# Patient Record
Sex: FEMALE | Race: BLACK OR AFRICAN AMERICAN | NOT HISPANIC OR LATINO | Employment: OTHER | URBAN - METROPOLITAN AREA
[De-identification: names, ages, dates, MRNs, and addresses within clinical notes are randomized per-mention and may not be internally consistent; named-entity substitution may affect disease eponyms.]

---

## 2022-08-23 ENCOUNTER — OFFICE VISIT (OUTPATIENT)
Dept: OBSTETRICS AND GYNECOLOGY | Facility: CLINIC | Age: 72
End: 2022-08-23
Payer: MEDICARE

## 2022-08-23 VITALS
WEIGHT: 183.38 LBS | DIASTOLIC BLOOD PRESSURE: 84 MMHG | HEIGHT: 68 IN | SYSTOLIC BLOOD PRESSURE: 183 MMHG | BODY MASS INDEX: 27.79 KG/M2

## 2022-08-23 DIAGNOSIS — N81.9 VAGINAL VAULT PROLAPSE: ICD-10-CM

## 2022-08-23 DIAGNOSIS — B37.31 YEAST VAGINITIS: ICD-10-CM

## 2022-08-23 DIAGNOSIS — N89.8 VAGINAL EROSION: ICD-10-CM

## 2022-08-23 DIAGNOSIS — R33.9 URINARY RETENTION: ICD-10-CM

## 2022-08-23 DIAGNOSIS — N81.10 BLADDER PROLAPSE, FEMALE, ACQUIRED: ICD-10-CM

## 2022-08-23 DIAGNOSIS — Z12.31 BREAST CANCER SCREENING BY MAMMOGRAM: ICD-10-CM

## 2022-08-23 DIAGNOSIS — N93.9 COMPLAINT OF VAGINAL BLEEDING: ICD-10-CM

## 2022-08-23 PROBLEM — D25.9 UTERINE LEIOMYOMA: Status: ACTIVE | Noted: 2022-08-23

## 2022-08-23 PROBLEM — Z78.0 POSTMENOPAUSAL: Status: ACTIVE | Noted: 2022-08-23

## 2022-08-23 PROCEDURE — 99203 OFFICE O/P NEW LOW 30 MIN: CPT | Mod: S$GLB,,, | Performed by: OBSTETRICS & GYNECOLOGY

## 2022-08-23 PROCEDURE — 99203 PR OFFICE/OUTPT VISIT, NEW, LEVL III, 30-44 MIN: ICD-10-PCS | Mod: S$GLB,,, | Performed by: OBSTETRICS & GYNECOLOGY

## 2022-08-23 RX ORDER — POTASSIUM CHLORIDE 750 MG/1
10 TABLET, EXTENDED RELEASE ORAL DAILY
COMMUNITY
Start: 2022-08-15

## 2022-08-23 RX ORDER — DOXAZOSIN 8 MG/1
8 TABLET ORAL 2 TIMES DAILY
COMMUNITY
Start: 2022-07-27 | End: 2022-08-23 | Stop reason: SDUPTHER

## 2022-08-23 RX ORDER — CETIRIZINE HYDROCHLORIDE 10 MG/1
10 TABLET ORAL
COMMUNITY
Start: 2022-02-15

## 2022-08-23 RX ORDER — METFORMIN HYDROCHLORIDE 500 MG/1
TABLET ORAL
COMMUNITY
Start: 2021-12-27

## 2022-08-23 RX ORDER — LATANOPROSTENE BUNOD 0.24 MG/ML
1 SOLUTION/ DROPS OPHTHALMIC
COMMUNITY
Start: 2022-02-15 | End: 2022-08-23 | Stop reason: SDUPTHER

## 2022-08-23 RX ORDER — AMLODIPINE BESYLATE 5 MG/1
5 TABLET ORAL DAILY
COMMUNITY
Start: 2022-05-17

## 2022-08-23 RX ORDER — POTASSIUM CHLORIDE 750 MG/1
10 TABLET, EXTENDED RELEASE ORAL
COMMUNITY
Start: 2022-02-15 | End: 2022-08-23 | Stop reason: SDUPTHER

## 2022-08-23 RX ORDER — FLUCONAZOLE 100 MG/1
100 TABLET ORAL DAILY
Qty: 3 TABLET | Refills: 0 | Status: SHIPPED | OUTPATIENT
Start: 2022-08-23 | End: 2022-08-26

## 2022-08-23 RX ORDER — HYDROCHLOROTHIAZIDE 25 MG/1
25 TABLET ORAL DAILY
COMMUNITY
Start: 2022-07-06 | End: 2022-08-23 | Stop reason: SDUPTHER

## 2022-08-23 RX ORDER — CIPROFLOXACIN 500 MG/1
500 TABLET ORAL 2 TIMES DAILY
Qty: 14 TABLET | Refills: 0 | Status: SHIPPED | OUTPATIENT
Start: 2022-08-23 | End: 2022-08-30

## 2022-08-23 RX ORDER — CHOLECALCIFEROL (VITAMIN D3) 10 MCG
CAPSULE ORAL
COMMUNITY
Start: 2022-02-15

## 2022-08-23 RX ORDER — HYDROCHLOROTHIAZIDE 25 MG/1
TABLET ORAL
COMMUNITY
Start: 2022-02-15

## 2022-08-23 RX ORDER — LATANOPROST 50 UG/ML
1 SOLUTION/ DROPS OPHTHALMIC NIGHTLY
COMMUNITY
Start: 2022-07-27

## 2022-08-23 RX ORDER — LEVOTHYROXINE SODIUM 75 UG/1
75 TABLET ORAL EVERY MORNING
COMMUNITY
Start: 2022-06-21 | End: 2022-08-23 | Stop reason: SDUPTHER

## 2022-08-23 RX ORDER — DORZOLAMIDE HYDROCHLORIDE AND TIMOLOL MALEATE 20; 5 MG/ML; MG/ML
1 SOLUTION/ DROPS OPHTHALMIC 2 TIMES DAILY
COMMUNITY
Start: 2022-06-21

## 2022-08-23 RX ORDER — METFORMIN HYDROCHLORIDE 500 MG/1
500 TABLET, EXTENDED RELEASE ORAL DAILY
COMMUNITY
Start: 2022-06-21 | End: 2022-08-23 | Stop reason: SDUPTHER

## 2022-08-23 RX ORDER — DOXAZOSIN 8 MG/1
TABLET ORAL
COMMUNITY
Start: 2022-02-22

## 2022-08-23 RX ORDER — LEVOTHYROXINE SODIUM 75 UG/1
TABLET ORAL
COMMUNITY
Start: 2022-02-15

## 2022-08-23 NOTE — PROGRESS NOTES
Angy Lane is a 72 y.o.  who presents today for GYN problem visit.  And request her yearly mammogram.  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  This appears to be the cause of her noted vaginal bleeding.  So we discussed options.  The patient has tried pessaries in the past and a size 3 was too large in caused a vaginal erosion and the size to worked well for a while but fell out when straining with a bowel movement and she has not been back since that time.  We discussed options with the patient and she agrees to try the size 2 pessary again.  But we need to treat this vaginal erosion 1st with an antibiotic and also she needs a yeast medication.  We will treat that for 1 week and I gave her instructions about trying to push her bladder back in a as often as possible so it will rub on her clothing.  We will have her return in 1-2 weeks for placement of another pessary.  She understands that surgery is also an option and she is not sexually active so we would do a surgery that would mostly close down the vaginal area.  She wants to try pessary again at this time.    C/C:   Chief Complaint   Patient presents with    Vaginal Bleeding     Patient is here for vaginal bleeding.       HPI: Has GYN related concerns including   As above, vaginal bleeding..     MENSTRUAL HISTORY  No LMP recorded. Patient is postmenopausal..      PAP HISTORY: last pap ,  denies any history of abnormal pap smear        Review of patient's allergies indicates:   Allergen Reactions    Amlodipine Swelling     leg swelling    Losartan Swelling     lips    Clindamycin Diarrhea    Amoxicillin-pot clavulanate Nausea And Vomiting    Clarithromycin Nausea And Vomiting     Past Medical History:   Diagnosis Date    Breast disorder     Diabetes mellitus     Hypertension     Thyroid  "disease      Past Surgical History:   Procedure Laterality Date    BREAST BIOPSY      SINUS SURGERY      THYROID SURGERY       Past Surgical History:   Procedure Laterality Date    BREAST BIOPSY      SINUS SURGERY      THYROID SURGERY       OB History    Para Term  AB Living   2 2 2         SAB IAB Ectopic Multiple Live Births                  # Outcome Date GA Lbr Oneal/2nd Weight Sex Delivery Anes PTL Lv   2 Term            1 Term              OB History        2    Para   2    Term   2            AB        Living           SAB        IAB        Ectopic        Multiple        Live Births                   Family History   Problem Relation Age of Onset    Hypertension Mother      Social History     Substance and Sexual Activity   Sexual Activity Not Currently       Carolina Mendez MD          ROS:  GENERAL: Denies weight gain or weight loss. Feeling well overall.   SKIN: Denies rash or lesions.   HEAD: Denies head injury or headache.   NODES: Denies enlarged lymph nodes.   CHEST: Denies chest pain or shortness of breath.    ABDOMEN: No abdominal pain, constipation, diarrhea, nausea, vomiting or rectal bleeding.   URINARY: No frequency, dysuria, hematuria, or burning on urination.  REPRODUCTIVE: See HPI.   BREASTS: denies pain, lumps, or nipple discharge.   HEMATOLOGIC: No easy bruisability or excessive bleeding.   MUSCULOSKELETAL: Denies joint pain or swelling.   NEUROLOGIC: Denies syncope or weakness.   PSYCHIATRIC: Denies depression, anxiety or mood swings.      OBJECTIVE:  BP (!) 183/84   Ht 5' 8" (1.727 m)   Wt 83.2 kg (183 lb 6.4 oz)   BMI 27.89 kg/m²   PHYSICAL EXAM:  APPEARANCE: Well nourished, well developed, in no acute distress.  AFFECT: WNL, alert and oriented x 3  SKIN: No acne or hirsutism  CHEST: Good respiratory effect  ABDOMEN: Soft.  No tenderness or masses.  No hepatosplenomegaly.  No hernias.  BREASTS:   PELVIC:     EXTREMITIES: No edema.      A:    72 y.o. female "  for GYN problem visit  Body mass index is 27.89 kg/m².  Patient Active Problem List   Diagnosis    Postmenopausal    Uterine leiomyoma    Breast cancer screening by mammogram    Bladder prolapse, female, acquired    Vaginal vault prolapse    Urinary retention    Vaginal erosion    Complaint of vaginal bleeding         P:  Breast cancer screening by mammogram    Bladder prolapse, female, acquired    Vaginal vault prolapse    Urinary retention    Vaginal erosion    Complaint of vaginal bleeding      ----Continue annual exams, return then or sooner prn    See HPI for plan.    No follow-ups on file.

## 2022-08-29 ENCOUNTER — HOSPITAL ENCOUNTER (OUTPATIENT)
Dept: RADIOLOGY | Facility: CLINIC | Age: 72
Discharge: HOME | End: 2022-08-29
Attending: OBSTETRICS & GYNECOLOGY
Payer: COMMERCIAL

## 2022-08-29 DIAGNOSIS — Z12.31 BREAST CANCER SCREENING BY MAMMOGRAM: ICD-10-CM

## 2022-08-29 PROCEDURE — 77063 BREAST TOMOSYNTHESIS BI: CPT | Mod: S$GLB,,, | Performed by: RADIOLOGY

## 2022-08-29 PROCEDURE — 77063 MAMMO DIGITAL SCREENING BILAT WITH TOMO: ICD-10-PCS | Mod: S$GLB,,, | Performed by: RADIOLOGY

## 2022-08-29 PROCEDURE — 77067 MAMMO DIGITAL SCREENING BILAT WITH TOMO: ICD-10-PCS | Mod: S$GLB,,, | Performed by: RADIOLOGY

## 2022-08-29 PROCEDURE — 77067 SCR MAMMO BI INCL CAD: CPT | Mod: S$GLB,,, | Performed by: RADIOLOGY

## 2022-09-08 ENCOUNTER — OFFICE VISIT (OUTPATIENT)
Dept: OBSTETRICS AND GYNECOLOGY | Facility: CLINIC | Age: 72
End: 2022-09-08
Payer: COMMERCIAL

## 2022-09-08 VITALS
DIASTOLIC BLOOD PRESSURE: 100 MMHG | SYSTOLIC BLOOD PRESSURE: 204 MMHG | HEIGHT: 68 IN | BODY MASS INDEX: 26.43 KG/M2 | WEIGHT: 174.38 LBS

## 2022-09-08 DIAGNOSIS — N81.10 BLADDER PROLAPSE, FEMALE, ACQUIRED: Primary | ICD-10-CM

## 2022-09-08 DIAGNOSIS — N81.9 VAGINAL VAULT PROLAPSE: ICD-10-CM

## 2022-09-08 DIAGNOSIS — N89.8 VAGINAL EROSION: ICD-10-CM

## 2022-09-08 PROCEDURE — 1159F MED LIST DOCD IN RCRD: CPT | Mod: S$GLB,,, | Performed by: OBSTETRICS & GYNECOLOGY

## 2022-09-08 PROCEDURE — 1160F RVW MEDS BY RX/DR IN RCRD: CPT | Mod: S$GLB,,, | Performed by: OBSTETRICS & GYNECOLOGY

## 2022-09-08 PROCEDURE — 99213 OFFICE O/P EST LOW 20 MIN: CPT | Mod: S$GLB,,, | Performed by: OBSTETRICS & GYNECOLOGY

## 2022-09-08 PROCEDURE — 3080F DIAST BP >= 90 MM HG: CPT | Mod: S$GLB,,, | Performed by: OBSTETRICS & GYNECOLOGY

## 2022-09-08 PROCEDURE — 3008F PR BODY MASS INDEX (BMI) DOCUMENTED: ICD-10-PCS | Mod: S$GLB,,, | Performed by: OBSTETRICS & GYNECOLOGY

## 2022-09-08 PROCEDURE — 1101F PT FALLS ASSESS-DOCD LE1/YR: CPT | Mod: S$GLB,,, | Performed by: OBSTETRICS & GYNECOLOGY

## 2022-09-08 PROCEDURE — 3288F FALL RISK ASSESSMENT DOCD: CPT | Mod: S$GLB,,, | Performed by: OBSTETRICS & GYNECOLOGY

## 2022-09-08 PROCEDURE — 1160F PR REVIEW ALL MEDS BY PRESCRIBER/CLIN PHARMACIST DOCUMENTED: ICD-10-PCS | Mod: S$GLB,,, | Performed by: OBSTETRICS & GYNECOLOGY

## 2022-09-08 PROCEDURE — 99213 PR OFFICE/OUTPT VISIT, EST, LEVL III, 20-29 MIN: ICD-10-PCS | Mod: S$GLB,,, | Performed by: OBSTETRICS & GYNECOLOGY

## 2022-09-08 PROCEDURE — 3077F PR MOST RECENT SYSTOLIC BLOOD PRESSURE >= 140 MM HG: ICD-10-PCS | Mod: S$GLB,,, | Performed by: OBSTETRICS & GYNECOLOGY

## 2022-09-08 PROCEDURE — 1101F PR PT FALLS ASSESS DOC 0-1 FALLS W/OUT INJ PAST YR: ICD-10-PCS | Mod: S$GLB,,, | Performed by: OBSTETRICS & GYNECOLOGY

## 2022-09-08 PROCEDURE — 3008F BODY MASS INDEX DOCD: CPT | Mod: S$GLB,,, | Performed by: OBSTETRICS & GYNECOLOGY

## 2022-09-08 PROCEDURE — 3080F PR MOST RECENT DIASTOLIC BLOOD PRESSURE >= 90 MM HG: ICD-10-PCS | Mod: S$GLB,,, | Performed by: OBSTETRICS & GYNECOLOGY

## 2022-09-08 PROCEDURE — 3077F SYST BP >= 140 MM HG: CPT | Mod: S$GLB,,, | Performed by: OBSTETRICS & GYNECOLOGY

## 2022-09-08 PROCEDURE — 1159F PR MEDICATION LIST DOCUMENTED IN MEDICAL RECORD: ICD-10-PCS | Mod: S$GLB,,, | Performed by: OBSTETRICS & GYNECOLOGY

## 2022-09-08 PROCEDURE — 3288F PR FALLS RISK ASSESSMENT DOCUMENTED: ICD-10-PCS | Mod: S$GLB,,, | Performed by: OBSTETRICS & GYNECOLOGY

## 2022-09-08 NOTE — PROGRESS NOTES
Angy Lane is a 72 y.o.  who presents today for GYN problem visit.     C/C:   Chief Complaint   Patient presents with    Follow-up     Patient following up for prolapsed bladder.       HPI: Has GYN related concerns including she has complete vaginal vault prolapse and had erosion on the cervix last appointment a couple of weeks ago.  It appeared to be possibly infected so we treated her with antibiotics by mouth for 10 days and now she returns to see if she can have a pessary placed again.  She did well with a size 2 pessary in the past and we will try replacing that again this time.  She understands how to care for the pessary.  She will return in 4 weeks for pessary check..     MENSTRUAL HISTORY  No LMP recorded. Patient is postmenopausal..      PAP HISTORY: last pap ,  denies any history of abnormal pap smear        Review of patient's allergies indicates:   Allergen Reactions    Amlodipine Swelling     leg swelling    Losartan Swelling     lips    Clindamycin Diarrhea    Amoxicillin-pot clavulanate Nausea And Vomiting    Clarithromycin Nausea And Vomiting     Past Medical History:   Diagnosis Date    Breast disorder     Diabetes mellitus     Hypertension     Thyroid disease      Past Surgical History:   Procedure Laterality Date    BREAST BIOPSY      SINUS SURGERY      THYROID SURGERY       Past Surgical History:   Procedure Laterality Date    BREAST BIOPSY      SINUS SURGERY      THYROID SURGERY       OB History    Para Term  AB Living   2 2 2         SAB IAB Ectopic Multiple Live Births                  # Outcome Date GA Lbr Oneal/2nd Weight Sex Delivery Anes PTL Lv   2 Term            1 Term              OB History          2    Para   2    Term   2            AB        Living             SAB        IAB        Ectopic        Multiple        Live Births                   Family History   Problem Relation Age of Onset    Hypertension Mother     Breast cancer Neg  "Hx      Social History     Substance and Sexual Activity   Sexual Activity Not Currently       Carolina Mendez MD          ROS:  GENERAL: Denies weight gain or weight loss. Feeling well overall.   SKIN: Denies rash or lesions.   HEAD: Denies head injury or headache.   NODES: Denies enlarged lymph nodes.   CHEST: Denies chest pain or shortness of breath.    ABDOMEN: No abdominal pain, constipation, diarrhea, nausea, vomiting or rectal bleeding.   URINARY: No frequency, dysuria, hematuria, or burning on urination.  REPRODUCTIVE: See HPI.   BREASTS: denies pain, lumps, or nipple discharge.   HEMATOLOGIC: No easy bruisability or excessive bleeding.   MUSCULOSKELETAL: Denies joint pain or swelling.   NEUROLOGIC: Denies syncope or weakness.   PSYCHIATRIC: Denies depression, anxiety or mood swings.      OBJECTIVE:  BP (!) 204/100   Ht 5' 8" (1.727 m)   Wt 79.1 kg (174 lb 6.4 oz)   BMI 26.52 kg/m²   PHYSICAL EXAM:  APPEARANCE: Well nourished, well developed, in no acute distress.  AFFECT: WNL, alert and oriented x 3  SKIN: No acne or hirsutism  CHEST: Good respiratory effect  ABDOMEN: Soft.  No tenderness or masses.  No hepatosplenomegaly.  No hernias.  BREASTS:   PELVIC:     EXTREMITIES: No edema.      A:  Vaginal vault prolapse with a Palak on the external cervix which is completely prolapsed at the time of exam.  Now that has resolved with antibiotic.  72 y.o. female  for GYN problem visit  Body mass index is 26.52 kg/m².  Patient Active Problem List   Diagnosis    Postmenopausal    Uterine leiomyoma    Breast cancer screening by mammogram    Bladder prolapse, female, acquired    Vaginal vault prolapse    Urinary retention    Vaginal erosion    Complaint of vaginal bleeding    Yeast vaginitis       Plan:  Size 2 pessary was placed today and she will return in 1 month for pessary check.  We discussed options again and she request to continue to try pessary treatment.  There are no diagnoses linked to this " encounter.  ----Continue annual exams, return then or sooner prn      No follow-ups on file.

## 2022-10-07 ENCOUNTER — TELEPHONE (OUTPATIENT)
Dept: OBSTETRICS AND GYNECOLOGY | Facility: CLINIC | Age: 72
End: 2022-10-07

## 2022-10-07 ENCOUNTER — OFFICE VISIT (OUTPATIENT)
Dept: OBSTETRICS AND GYNECOLOGY | Facility: CLINIC | Age: 72
End: 2022-10-07
Payer: COMMERCIAL

## 2022-10-07 VITALS
SYSTOLIC BLOOD PRESSURE: 161 MMHG | DIASTOLIC BLOOD PRESSURE: 67 MMHG | WEIGHT: 168 LBS | BODY MASS INDEX: 25.46 KG/M2 | HEIGHT: 68 IN

## 2022-10-07 DIAGNOSIS — R33.9 URINARY RETENTION: ICD-10-CM

## 2022-10-07 DIAGNOSIS — N81.10 BLADDER PROLAPSE, FEMALE, ACQUIRED: Primary | ICD-10-CM

## 2022-10-07 DIAGNOSIS — N81.9 VAGINAL VAULT PROLAPSE: ICD-10-CM

## 2022-10-07 DIAGNOSIS — Z46.89 PESSARY MAINTENANCE: ICD-10-CM

## 2022-10-07 PROCEDURE — 1160F PR REVIEW ALL MEDS BY PRESCRIBER/CLIN PHARMACIST DOCUMENTED: ICD-10-PCS | Mod: S$GLB,,, | Performed by: OBSTETRICS & GYNECOLOGY

## 2022-10-07 PROCEDURE — 3008F BODY MASS INDEX DOCD: CPT | Mod: S$GLB,,, | Performed by: OBSTETRICS & GYNECOLOGY

## 2022-10-07 PROCEDURE — 99213 PR OFFICE/OUTPT VISIT, EST, LEVL III, 20-29 MIN: ICD-10-PCS | Mod: 25,S$GLB,, | Performed by: OBSTETRICS & GYNECOLOGY

## 2022-10-07 PROCEDURE — 3078F PR MOST RECENT DIASTOLIC BLOOD PRESSURE < 80 MM HG: ICD-10-PCS | Mod: S$GLB,,, | Performed by: OBSTETRICS & GYNECOLOGY

## 2022-10-07 PROCEDURE — 1101F PT FALLS ASSESS-DOCD LE1/YR: CPT | Mod: S$GLB,,, | Performed by: OBSTETRICS & GYNECOLOGY

## 2022-10-07 PROCEDURE — 3288F FALL RISK ASSESSMENT DOCD: CPT | Mod: S$GLB,,, | Performed by: OBSTETRICS & GYNECOLOGY

## 2022-10-07 PROCEDURE — 1159F PR MEDICATION LIST DOCUMENTED IN MEDICAL RECORD: ICD-10-PCS | Mod: S$GLB,,, | Performed by: OBSTETRICS & GYNECOLOGY

## 2022-10-07 PROCEDURE — 1159F MED LIST DOCD IN RCRD: CPT | Mod: S$GLB,,, | Performed by: OBSTETRICS & GYNECOLOGY

## 2022-10-07 PROCEDURE — 3008F PR BODY MASS INDEX (BMI) DOCUMENTED: ICD-10-PCS | Mod: S$GLB,,, | Performed by: OBSTETRICS & GYNECOLOGY

## 2022-10-07 PROCEDURE — 57160 INSERT PESSARY/OTHER DEVICE: CPT | Mod: S$GLB,,, | Performed by: OBSTETRICS & GYNECOLOGY

## 2022-10-07 PROCEDURE — 1160F RVW MEDS BY RX/DR IN RCRD: CPT | Mod: S$GLB,,, | Performed by: OBSTETRICS & GYNECOLOGY

## 2022-10-07 PROCEDURE — 1101F PR PT FALLS ASSESS DOC 0-1 FALLS W/OUT INJ PAST YR: ICD-10-PCS | Mod: S$GLB,,, | Performed by: OBSTETRICS & GYNECOLOGY

## 2022-10-07 PROCEDURE — 3077F PR MOST RECENT SYSTOLIC BLOOD PRESSURE >= 140 MM HG: ICD-10-PCS | Mod: S$GLB,,, | Performed by: OBSTETRICS & GYNECOLOGY

## 2022-10-07 PROCEDURE — 3077F SYST BP >= 140 MM HG: CPT | Mod: S$GLB,,, | Performed by: OBSTETRICS & GYNECOLOGY

## 2022-10-07 PROCEDURE — 99213 OFFICE O/P EST LOW 20 MIN: CPT | Mod: 25,S$GLB,, | Performed by: OBSTETRICS & GYNECOLOGY

## 2022-10-07 PROCEDURE — 57160 PR FIT/INSERT INTRAVAG SUPPORT DEVICE: ICD-10-PCS | Mod: S$GLB,,, | Performed by: OBSTETRICS & GYNECOLOGY

## 2022-10-07 PROCEDURE — 3288F PR FALLS RISK ASSESSMENT DOCUMENTED: ICD-10-PCS | Mod: S$GLB,,, | Performed by: OBSTETRICS & GYNECOLOGY

## 2022-10-07 PROCEDURE — 3078F DIAST BP <80 MM HG: CPT | Mod: S$GLB,,, | Performed by: OBSTETRICS & GYNECOLOGY

## 2022-10-07 RX ORDER — DORZOLAMIDE HYDROCHLORIDE AND TIMOLOL MALEATE PRESERVATIVE FREE 20; 5 MG/ML; MG/ML
1 SOLUTION/ DROPS OPHTHALMIC
COMMUNITY
Start: 2022-09-19

## 2022-10-07 NOTE — TELEPHONE ENCOUNTER
----- Message from Diane Moe MA sent at 10/6/2022  2:12 PM CDT -----  Regarding: appt  Patient called and canceled appt for tomorrow at 9:30. She stated she would call back to schedule at a later time. She stated she went to see her PCP and they did an u/s of renal and bladder yesterday due to blood. She is seeing urology tomorrow at 10:30 a.m. She said she will call once she finds out what is going on to schedule an appt for her pessary.

## 2022-10-07 NOTE — PROGRESS NOTES
Angy Lane is a 72 y.o.  who presents today for GYN problem visit.     C/C:   Chief Complaint   Patient presents with    Follow-up     Patient is here for follow-up and pessary insertion.       HPI: Has GYN related concerns including patient has a size 2 pessary that frequently falls out and she wants a larger pessary.  We did not have a size 3 today but we try to size for an it seems to fit appropriately.  So she will continue to try that.  On exam today she also has a cyst measuring approximately 10 cm in her mons area near the pubic bone mostly on the left and I asked her if it was painful and she said no and I also asked her if it is always that big and she states sometimes it gets bigger and then it goes down and it has never drained so this sounds like a possible hernia in that area but it feels more like a cystic mass.  She has been having some bladder problems and she is getting a bladder workup with CT scan of that area and hopefully they will be determining what this 10 cm bulging in the mons pubis area is..     MENSTRUAL HISTORY  No LMP recorded. Patient is postmenopausal..      PAP HISTORY: last pap ,  denies any history of abnormal pap smear        Review of patient's allergies indicates:   Allergen Reactions    Amlodipine Swelling     leg swelling    Losartan Swelling     lips    Clindamycin Diarrhea    Amoxicillin-pot clavulanate Nausea And Vomiting    Clarithromycin Nausea And Vomiting     Past Medical History:   Diagnosis Date    Breast disorder     Diabetes mellitus     Hypertension     Thyroid disease      Past Surgical History:   Procedure Laterality Date    BREAST BIOPSY      SINUS SURGERY      THYROID SURGERY       Past Surgical History:   Procedure Laterality Date    BREAST BIOPSY      SINUS SURGERY      THYROID SURGERY       OB History    Para Term  AB Living   2 2 2         SAB IAB Ectopic Multiple Live Births                  # Outcome Date GA Lbr Oneal/2nd  "Weight Sex Delivery Anes PTL Lv   2 Term            1 Term              OB History          2    Para   2    Term   2            AB        Living             SAB        IAB        Ectopic        Multiple        Live Births                   Family History   Problem Relation Age of Onset    Hypertension Mother     Breast cancer Neg Hx      Social History     Substance and Sexual Activity   Sexual Activity Not Currently       Carolina Mendez MD          ROS:  GENERAL: Denies weight gain or weight loss. Feeling well overall.   SKIN: Denies rash or lesions.   HEAD: Denies head injury or headache.   NODES: Denies enlarged lymph nodes.   CHEST: Denies chest pain or shortness of breath.    ABDOMEN: No abdominal pain, constipation, diarrhea, nausea, vomiting or rectal bleeding.   URINARY: No frequency, dysuria, hematuria, or burning on urination.  REPRODUCTIVE: See HPI.   BREASTS: denies pain, lumps, or nipple discharge.   HEMATOLOGIC: No easy bruisability or excessive bleeding.   MUSCULOSKELETAL: Denies joint pain or swelling.   NEUROLOGIC: Denies syncope or weakness.   PSYCHIATRIC: Denies depression, anxiety or mood swings.      OBJECTIVE:  BP (!) 161/67   Ht 5' 8" (1.727 m)   Wt 76.2 kg (168 lb)   BMI 25.54 kg/m²   PHYSICAL EXAM:  APPEARANCE: Well nourished, well developed, in no acute distress.  AFFECT: WNL, alert and oriented x 3  SKIN: No acne or hirsutism  CHEST: Good respiratory effect  ABDOMEN: Soft.  No tenderness or masses.  No hepatosplenomegaly.  No hernias.  Mons pubis area is showing a 10 cm cystic feeling mass to the left side of midline and extends over toward the inguinal area so this may represent a hernia or some type of cyst formation.  She is presently starting to get a workup of her bladder problems by her urologist.  BREASTS:  Deferred  PELVIC:  Vaginal area feels normal and pessary was removed and it appears normal in the vaginal area.  Size 4 pessary was replaced.    EXTREMITIES: No " edema.      A:  Pessary check and replacement of different size pessary  72 y.o. female  for GYN problem visit  Body mass index is 25.54 kg/m².  Patient Active Problem List   Diagnosis    Postmenopausal    Uterine leiomyoma    Breast cancer screening by mammogram    Bladder prolapse, female, acquired    Vaginal vault prolapse    Urinary retention    Vaginal erosion    Complaint of vaginal bleeding    Yeast vaginitis         P:  Continue with this size pessary and have a recheck in 4-6 months.  She understands how to care for the pessary.  There are no diagnoses linked to this encounter.  ----Continue annual exams, return then or sooner prn      No follow-ups on file.

## 2023-04-06 ENCOUNTER — OFFICE VISIT (OUTPATIENT)
Dept: OBSTETRICS AND GYNECOLOGY | Facility: CLINIC | Age: 73
End: 2023-04-06
Payer: MEDICARE

## 2023-04-06 VITALS
SYSTOLIC BLOOD PRESSURE: 160 MMHG | HEIGHT: 68 IN | HEART RATE: 62 BPM | BODY MASS INDEX: 26.52 KG/M2 | DIASTOLIC BLOOD PRESSURE: 74 MMHG | WEIGHT: 175 LBS

## 2023-04-06 DIAGNOSIS — N81.10 BLADDER PROLAPSE, FEMALE, ACQUIRED: ICD-10-CM

## 2023-04-06 DIAGNOSIS — R33.9 URINARY RETENTION: ICD-10-CM

## 2023-04-06 DIAGNOSIS — Z12.31 BREAST CANCER SCREENING BY MAMMOGRAM: Primary | ICD-10-CM

## 2023-04-06 DIAGNOSIS — Z46.89 PESSARY MAINTENANCE: ICD-10-CM

## 2023-04-06 DIAGNOSIS — N81.9 VAGINAL VAULT PROLAPSE: ICD-10-CM

## 2023-04-06 PROCEDURE — 57160 PR FIT/INSERT INTRAVAG SUPPORT DEVICE: ICD-10-PCS | Mod: S$GLB,,, | Performed by: OBSTETRICS & GYNECOLOGY

## 2023-04-06 PROCEDURE — 57160 INSERT PESSARY/OTHER DEVICE: CPT | Mod: S$GLB,,, | Performed by: OBSTETRICS & GYNECOLOGY

## 2023-04-06 NOTE — PROGRESS NOTES
Angy Lane is a 73 y.o.  who presents today for GYN problem visit.     C/C:   Chief Complaint   Patient presents with    Pessary change     Patient states her pessary keeps falling out.        HPI: Has GYN related concerns including patient states the pessary keeps falling out.  She is able to replace it each time, but states that it irritates her when she tries to put it in herself so she wants to try a bigger pessary today.  She also sees her urologist Dr. Orona because she had some urinary retention and he had to place stents in the ureters and has now remove those stents and she appears to be doing well with her urinary retention problems are not as bad if she can continue to wear a pessary.  So we will place a size 4 pessary today which is slightly larger than her last 1.  She understands how to care for the pessary and how to keep it in when she straining or coughing.  She also wants mammogram ordered when it is due.  Last mammogram was 2022.    MENSTRUAL HISTORY  No LMP recorded. Patient is postmenopausal..      PAP HISTORY: last pap unknown,  denies any history of abnormal pap smear        Review of patient's allergies indicates:   Allergen Reactions    Amlodipine Swelling     leg swelling    Losartan Swelling     lips    Clindamycin Diarrhea    Amoxicillin-pot clavulanate Nausea And Vomiting    Clarithromycin Nausea And Vomiting     Past Medical History:   Diagnosis Date    Breast disorder     Diabetes mellitus     Hypertension     Thyroid disease      Past Surgical History:   Procedure Laterality Date    BREAST BIOPSY      SINUS SURGERY      THYROID SURGERY       Past Surgical History:   Procedure Laterality Date    BREAST BIOPSY      SINUS SURGERY      THYROID SURGERY       OB History    Para Term  AB Living   2 2 2         SAB IAB Ectopic Multiple Live Births                  # Outcome Date GA Lbr Oneal/2nd Weight Sex Delivery Anes PTL Lv   2 Term            1 Term    "           OB History          2    Para   2    Term   2            AB        Living             SAB        IAB        Ectopic        Multiple        Live Births                   Family History   Problem Relation Age of Onset    Hypertension Mother     Breast cancer Neg Hx      Social History     Substance and Sexual Activity   Sexual Activity Not Currently       Carolina Mendez MD          ROS:  GENERAL: Denies weight gain or weight loss. Feeling well overall.   SKIN: Denies rash or lesions.   HEAD: Denies head injury or headache.   NODES: Denies enlarged lymph nodes.   CHEST: Denies chest pain or shortness of breath.    ABDOMEN: No abdominal pain, constipation, diarrhea, nausea, vomiting or rectal bleeding.   URINARY: No frequency, dysuria, hematuria, or burning on urination.  REPRODUCTIVE: See HPI.   BREASTS: denies pain, lumps, or nipple discharge.   HEMATOLOGIC: No easy bruisability or excessive bleeding.   MUSCULOSKELETAL: Denies joint pain or swelling.   NEUROLOGIC: Denies syncope or weakness.   PSYCHIATRIC: Denies depression, anxiety or mood swings.      OBJECTIVE:  BP (!) 160/74   Pulse 62   Ht 5' 8" (1.727 m)   Wt 79.4 kg (175 lb)   BMI 26.61 kg/m²   PHYSICAL EXAM:  APPEARANCE: Well nourished, well developed, in no acute distress.  AFFECT: WNL, alert and oriented x 3  SKIN: No acne or hirsutism  CHEST: Good respiratory effect  ABDOMEN: Soft.  No tenderness or masses.  No hepatosplenomegaly.  No hernias.  BREASTS:   PELVIC:   Vaginal exam shows similar vaginal vault prolapse and uterus prolapse as prior exams.  No vaginal lesions or erosions.  Removed her pessary and replaced with a slightly larger pessary.  EXTREMITIES: No edema.      A:  Pessary maintenance.    Vaginal vault prolapse.    Urinary retention.    73 y.o. female  for GYN problem visit  Body mass index is 26.61 kg/m².  Patient Active Problem List   Diagnosis    Postmenopausal    Uterine leiomyoma    Pessary maintenance    " Bladder prolapse, female, acquired    Vaginal vault prolapse    Urinary retention    Vaginal erosion    Complaint of vaginal bleeding    Yeast vaginitis         P:  Replace pessary with the larger pessary today.  Will order mammogram when it is due in August.  Breast cancer screening by mammogram  -     Mammo Digital Screening Bilat w/ [facility]; Future; Expected date: 10/06/2023    Pessary maintenance    Bladder prolapse, female, acquired    Urinary retention    Vaginal vault prolapse      ----Continue annual exams, return then or sooner prn      No follow-ups on file.

## 2023-10-24 ENCOUNTER — HOSPITAL ENCOUNTER (OUTPATIENT)
Dept: RADIOLOGY | Facility: HOSPITAL | Age: 73
Discharge: HOME | End: 2023-10-24
Attending: OBSTETRICS & GYNECOLOGY
Payer: MEDICARE

## 2023-10-24 DIAGNOSIS — Z12.31 BREAST CANCER SCREENING BY MAMMOGRAM: ICD-10-CM

## 2023-10-24 PROCEDURE — 77067 SCR MAMMO BI INCL CAD: CPT | Mod: TC

## 2023-10-24 PROCEDURE — 77067 SCR MAMMO BI INCL CAD: CPT | Mod: 26,,, | Performed by: RADIOLOGY

## 2023-10-24 PROCEDURE — 77063 MAMMO DIGITAL SCREENING BILAT WITH TOMO: ICD-10-PCS | Mod: 26,,, | Performed by: RADIOLOGY

## 2023-10-24 PROCEDURE — 77063 BREAST TOMOSYNTHESIS BI: CPT | Mod: 26,,, | Performed by: RADIOLOGY

## 2023-10-24 PROCEDURE — 77067 MAMMO DIGITAL SCREENING BILAT WITH TOMO: ICD-10-PCS | Mod: 26,,, | Performed by: RADIOLOGY
